# Patient Record
(demographics unavailable — no encounter records)

---

## 2024-12-12 NOTE — HISTORY OF PRESENT ILLNESS
[FreeTextEntry1] : pt is a 25 y/o p0 lmp 10/15 presents for new pt annual gyn visit with +ucg undesired pregnancy